# Patient Record
Sex: MALE | Race: BLACK OR AFRICAN AMERICAN
[De-identification: names, ages, dates, MRNs, and addresses within clinical notes are randomized per-mention and may not be internally consistent; named-entity substitution may affect disease eponyms.]

---

## 2018-04-02 ENCOUNTER — HOSPITAL ENCOUNTER (EMERGENCY)
Dept: HOSPITAL 17 - PHED | Age: 48
Discharge: HOME | End: 2018-04-02
Payer: COMMERCIAL

## 2018-04-02 VITALS
OXYGEN SATURATION: 97 % | SYSTOLIC BLOOD PRESSURE: 145 MMHG | DIASTOLIC BLOOD PRESSURE: 83 MMHG | RESPIRATION RATE: 16 BRPM | HEART RATE: 71 BPM

## 2018-04-02 VITALS
RESPIRATION RATE: 18 BRPM | SYSTOLIC BLOOD PRESSURE: 135 MMHG | DIASTOLIC BLOOD PRESSURE: 80 MMHG | OXYGEN SATURATION: 100 % | HEART RATE: 62 BPM

## 2018-04-02 VITALS — WEIGHT: 218.92 LBS | BODY MASS INDEX: 32.42 KG/M2 | HEIGHT: 69 IN

## 2018-04-02 VITALS — OXYGEN SATURATION: 98 %

## 2018-04-02 VITALS
SYSTOLIC BLOOD PRESSURE: 141 MMHG | OXYGEN SATURATION: 98 % | TEMPERATURE: 98 F | RESPIRATION RATE: 16 BRPM | DIASTOLIC BLOOD PRESSURE: 80 MMHG | HEART RATE: 77 BPM

## 2018-04-02 DIAGNOSIS — R07.89: Primary | ICD-10-CM

## 2018-04-02 DIAGNOSIS — E11.9: ICD-10-CM

## 2018-04-02 DIAGNOSIS — M62.838: ICD-10-CM

## 2018-04-02 LAB
ALBUMIN SERPL-MCNC: 3.6 GM/DL (ref 3.4–5)
ALP SERPL-CCNC: 84 U/L (ref 45–117)
ALT SERPL-CCNC: 33 U/L (ref 12–78)
AST SERPL-CCNC: 23 U/L (ref 15–37)
BASOPHILS # BLD AUTO: 0.2 TH/MM3 (ref 0–0.2)
BASOPHILS NFR BLD: 1.9 % (ref 0–2)
BILIRUB SERPL-MCNC: 0.5 MG/DL (ref 0.2–1)
BUN SERPL-MCNC: 15 MG/DL (ref 7–18)
CALCIUM SERPL-MCNC: 9.2 MG/DL (ref 8.5–10.1)
CHLORIDE SERPL-SCNC: 104 MEQ/L (ref 98–107)
CREAT SERPL-MCNC: 0.98 MG/DL (ref 0.6–1.3)
EOSINOPHIL # BLD: 0.3 TH/MM3 (ref 0–0.4)
EOSINOPHIL NFR BLD: 3.3 % (ref 0–4)
ERYTHROCYTE [DISTWIDTH] IN BLOOD BY AUTOMATED COUNT: 13.3 % (ref 11.6–17.2)
GFR SERPLBLD BASED ON 1.73 SQ M-ARVRAT: 99 ML/MIN (ref 89–?)
GLUCOSE SERPL-MCNC: 270 MG/DL (ref 74–106)
HCO3 BLD-SCNC: 28.9 MEQ/L (ref 21–32)
HCT VFR BLD CALC: 48.3 % (ref 39–51)
HGB BLD-MCNC: 15.7 GM/DL (ref 13–17)
INR PPP: 1.1 RATIO
LYMPHOCYTES # BLD AUTO: 3.4 TH/MM3 (ref 1–4.8)
LYMPHOCYTES NFR BLD AUTO: 42 % (ref 9–44)
MCH RBC QN AUTO: 27.5 PG (ref 27–34)
MCHC RBC AUTO-ENTMCNC: 32.4 % (ref 32–36)
MCV RBC AUTO: 84.9 FL (ref 80–100)
MONOCYTE #: 0.6 TH/MM3 (ref 0–0.9)
MONOCYTES NFR BLD: 7.9 % (ref 0–8)
NEUTROPHILS # BLD AUTO: 3.7 TH/MM3 (ref 1.8–7.7)
NEUTROPHILS NFR BLD AUTO: 44.9 % (ref 16–70)
PLATELET # BLD: 217 TH/MM3 (ref 150–450)
PMV BLD AUTO: 9.4 FL (ref 7–11)
PROT SERPL-MCNC: 7.8 GM/DL (ref 6.4–8.2)
PROTHROMBIN TIME: 10.8 SEC (ref 9.8–11.6)
RBC # BLD AUTO: 5.69 MIL/MM3 (ref 4.5–5.9)
SODIUM SERPL-SCNC: 140 MEQ/L (ref 136–145)
TROPONIN I SERPL-MCNC: (no result) NG/ML (ref 0.02–0.05)
WBC # BLD AUTO: 8.2 TH/MM3 (ref 4–11)

## 2018-04-02 PROCEDURE — 84484 ASSAY OF TROPONIN QUANT: CPT

## 2018-04-02 PROCEDURE — 93005 ELECTROCARDIOGRAM TRACING: CPT

## 2018-04-02 PROCEDURE — 82550 ASSAY OF CK (CPK): CPT

## 2018-04-02 PROCEDURE — 85610 PROTHROMBIN TIME: CPT

## 2018-04-02 PROCEDURE — 99285 EMERGENCY DEPT VISIT HI MDM: CPT

## 2018-04-02 PROCEDURE — 80053 COMPREHEN METABOLIC PANEL: CPT

## 2018-04-02 PROCEDURE — 85025 COMPLETE CBC W/AUTO DIFF WBC: CPT

## 2018-04-02 PROCEDURE — 71045 X-RAY EXAM CHEST 1 VIEW: CPT

## 2018-04-02 PROCEDURE — 85730 THROMBOPLASTIN TIME PARTIAL: CPT

## 2018-04-02 NOTE — PD
HPI


Chief Complaint:  Chest Pain


Time Seen by Provider:  09:44


Travel History


International Travel<30 days:  No


Contact w/Intl Traveler<30days:  No


Traveled to known affect area:  No





History of Present Illness


HPI


47-year-old male complains of right-sided chest pain.  Patient stated pain 

aching pain localized the right chest and right upper back.  Patient denies any 

pain radiation.  Patient denies palpitation or diaphoresis.  Patient states the 

pain is worse with movement of the right arm.  Patient denies any coughing 

congestion fever chills.  Patient denies any history of CAD.





PFSH


Past Medical History


Diabetes:  Yes





Social History


Tobacco Use:  No





Allergies-Medications


(Allergen,Severity, Reaction):  


Coded Allergies:  


     No Known Allergies (Unverified , 4/2/18)


Reported Meds & Prescriptions





Reported Meds & Active Scripts


Active


Robaxin (Methocarbamol) 750 Mg Tab 750 Mg PO QID


Ibuprofen 600 Mg Tab 600 Mg PO TID


Reported


Lantus Solostar Pen Inj (Insulin Glargine) 300 Unit/3 Ml Pen 30 Units SQ DAILY


Glipizide 5 Mg Tab 5 Mg PO BIDAC


     Take 30 minutes before a meal


Metformin (Metformin HCl) 1,000 Mg Tab 1,000 Mg PO BIDPC








Review of Systems


General / Constitutional:  No: Fever


Eyes:  No: Visual changes


HENT:  No: Headaches


Cardiovascular:  Positive: Chest Pain or Discomfort


Respiratory:  No: Shortness of Breath


Gastrointestinal:  No: Abdominal Pain


Genitourinary:  No: Dysuria


Musculoskeletal:  No: Pain


Skin:  No Rash


Neurologic:  No: Weakness


Psychiatric:  No: Depression


Endocrine:  No: Polydipsia


Hematologic/Lymphatic:  No: Easy Bruising





Physical Exam


Narrative


GENERAL: Well-nourished, well-developed patient.


SKIN: Focused skin assessment warm/dry.


HEAD: Normocephalic.


EYES: No scleral icterus. No injection or drainage. 


NECK: Supple, trachea midline. No JVD or lymphadenopathy.


CARDIOVASCULAR: Regular rate and rhythm without murmurs, gallops, or rubs. 


RESPIRATORY: Breath sounds equal bilaterally. No accessory muscle use.


GASTROINTESTINAL: Abdomen soft, non-tender, nondistended. 


MUSCULOSKELETAL: No cyanosis, or edema. 


BACK: Nontender without obvious deformity. No CVA tenderness.


Neurologic exam normal.





Data


Data


Last Documented VS





Vital Signs








  Date Time  Temp Pulse Resp B/P (MAP) Pulse Ox O2 Delivery O2 Flow Rate FiO2


 


4/2/18 12:40        


 


4/2/18 12:40  71 16  97 Room Air  


 


4/2/18 09:40 98.0       








Orders





 Orders


Electrocardiogram (4/2/18 09:47)


Complete Blood Count With Diff (4/2/18 09:47)


Comprehensive Metabolic Panel (4/2/18 09:47)


Creatine Kinase (Cpk) (4/2/18 09:47)


Troponin I (4/2/18 09:47)


Prothrombin Time / Inr (Pt) (4/2/18 09:47)


Act Partial Throm Time (Ptt) (4/2/18 09:47)


Chest, Single Ap (4/2/18 09:47)


Iv Access Insert/Monitor (4/2/18 09:47)


Ecg Monitoring (4/2/18 09:47)


Oximetry (4/2/18 09:47)


Ed Discharge Order (4/2/18 12:16)





Labs





Laboratory Tests








Test


  4/2/18


09:50


 


White Blood Count 8.2 TH/MM3 


 


Red Blood Count 5.69 MIL/MM3 


 


Hemoglobin 15.7 GM/DL 


 


Hematocrit 48.3 % 


 


Mean Corpuscular Volume 84.9 FL 


 


Mean Corpuscular Hemoglobin 27.5 PG 


 


Mean Corpuscular Hemoglobin


Concent 32.4 % 


 


 


Red Cell Distribution Width 13.3 % 


 


Platelet Count 217 TH/MM3 


 


Mean Platelet Volume 9.4 FL 


 


Neutrophils (%) (Auto) 44.9 % 


 


Lymphocytes (%) (Auto) 42.0 % 


 


Monocytes (%) (Auto) 7.9 % 


 


Eosinophils (%) (Auto) 3.3 % 


 


Basophils (%) (Auto) 1.9 % 


 


Neutrophils # (Auto) 3.7 TH/MM3 


 


Lymphocytes # (Auto) 3.4 TH/MM3 


 


Monocytes # (Auto) 0.6 TH/MM3 


 


Eosinophils # (Auto) 0.3 TH/MM3 


 


Basophils # (Auto) 0.2 TH/MM3 


 


CBC Comment DIFF FINAL 


 


Differential Comment  


 


Prothrombin Time 10.8 SEC 


 


Prothromb Time International


Ratio 1.1 RATIO 


 


 


Activated Partial


Thromboplast Time 25.6 SEC 


 


 


Blood Urea Nitrogen 15 MG/DL 


 


Creatinine 0.98 MG/DL 


 


Random Glucose 270 MG/DL 


 


Total Protein 7.8 GM/DL 


 


Albumin 3.6 GM/DL 


 


Calcium Level 9.2 MG/DL 


 


Alkaline Phosphatase 84 U/L 


 


Aspartate Amino Transf


(AST/SGOT) 23 U/L 


 


 


Alanine Aminotransferase


(ALT/SGPT) 33 U/L 


 


 


Total Bilirubin 0.5 MG/DL 


 


Sodium Level 140 MEQ/L 


 


Potassium Level 4.2 MEQ/L 


 


Chloride Level 104 MEQ/L 


 


Carbon Dioxide Level 28.9 MEQ/L 


 


Anion Gap 7 MEQ/L 


 


Estimat Glomerular Filtration


Rate 99 ML/MIN 


 


 


Total Creatine Kinase 263 U/L 


 


Troponin I


  LESS THAN 0.02


NG/ML











MDM


Medical Decision Making


Medical Screen Exam Complete:  Yes


Emergency Medical Condition:  Yes


Interpretation(s)





Last Impressions








Chest X-Ray 4/2/18 7727 Signed





Impressions: 





 Service Date/Time:  Monday, April 2, 2018 10:12 - CONCLUSION:  No acute 





 cardiopulmonary process to explain current clinical symptoms.     Sander Kraus MD 





12:10 PM.  CBC within normal limits.  CMP within normal limits.  Cardiac 

enzymes are normal.  Glucose 270.


Differential Diagnosis


Differential diagnosis including musculoskeletal, angina, MI, PE, pneumothorax.


Narrative Course


47-year-old male with right-sided chest pain.





Diagnosis





 Primary Impression:  


 Atypical chest pain


 Additional Impression:  


 Muscle spasm


Patient Instructions:  General Instructions





***Additional Instructions:  


Take medication as needed for pain.  Follow-up with personal physician.  Return 

if increasing chest pain shortness of breath.


***Med/Other Pt SpecificInfo:  Prescription(s) given


Scripts


Methocarbamol (Robaxin) 750 Mg Tab


750 MG PO QID for Muscle Spasm, #40 TAB 0 Refills


   Prov: Didier Parks MD         4/2/18 


Ibuprofen (Ibuprofen) 600 Mg Tab


600 MG PO TID for Pain, #30 TAB 0 Refills


   Prov: Didier Parks MD         4/2/18


Disposition:  01 DISCHARGE HOME


Condition:  Stable











Didier Parks MD Apr 2, 2018 09:49

## 2018-04-02 NOTE — RADRPT
EXAM DATE/TIME:  04/02/2018 10:12 

 

[facility] COMPARISON:     

No previous studies available for comparison.

 

                     

INDICATIONS :     

Chest pressure right side

                     

 

MEDICAL HISTORY :     

None.          

 

SURGICAL HISTORY :     

None.   

 

ENCOUNTER:     

Initial                                        

 

ACUITY:     

1 day      

 

PAIN SCORE:     

0/10

 

LOCATION:      

chest 

 

FINDINGS:     

A single view of the chest demonstrates the lungs to be symmetrically aerated without evidence of mas
s, infiltrate or effusion.  The cardiomediastinal contours are unremarkable.  Osseous structures are 
intact with some degenerative spurring of the dorsal spine.

 

CONCLUSION:     

No acute cardiopulmonary process to explain current clinical symptoms.

 

 

 

 Sander Kraus MD on April 02, 2018 at 10:50           

Board Certified Radiologist.

 This report was verified electronically.

## 2018-04-02 NOTE — EKG
Date Performed: 04/02/2018       Time Performed: 09:39:53

 

PTAGE:      47 years

 

EKG:      Sinus rhythm 

 

 WITH SINUS ARRHYTHMIA NONSPECIFIC T-WAVE ABNORMALITY BORDERLINE ECG 

 

NO PREVIOUS TRACING            

 

DOCTOR:   Gianna Chacon  Interpretating Date/Time  04/02/2018 22:46:29